# Patient Record
Sex: MALE | Race: WHITE | Employment: UNEMPLOYED | URBAN - METROPOLITAN AREA
[De-identification: names, ages, dates, MRNs, and addresses within clinical notes are randomized per-mention and may not be internally consistent; named-entity substitution may affect disease eponyms.]

---

## 2017-03-05 ENCOUNTER — HOSPITAL ENCOUNTER (INPATIENT)
Facility: CLINIC | Age: 42
LOS: 4 days | Discharge: HOME | DRG: 897 | End: 2017-03-09
Attending: FAMILY MEDICINE | Admitting: PSYCHIATRY & NEUROLOGY
Payer: COMMERCIAL

## 2017-03-05 DIAGNOSIS — F11.23 OPIOID DEPENDENCE WITH WITHDRAWAL (H): ICD-10-CM

## 2017-03-05 DIAGNOSIS — F11.20 UNCOMPLICATED OPIOID DEPENDENCE (H): ICD-10-CM

## 2017-03-05 DIAGNOSIS — F11.20 OPIOID USE DISORDER, SEVERE, DEPENDENCE (H): Primary | ICD-10-CM

## 2017-03-05 DIAGNOSIS — F39 MOOD DISORDER (H): ICD-10-CM

## 2017-03-05 PROBLEM — F11.93 HEROIN WITHDRAWAL (H): Status: ACTIVE | Noted: 2017-03-05

## 2017-03-05 LAB
AMPHETAMINES UR QL SCN: ABNORMAL
BARBITURATES UR QL: ABNORMAL
BENZODIAZ UR QL: ABNORMAL
CANNABINOIDS UR QL SCN: ABNORMAL
COCAINE UR QL: ABNORMAL
ETHANOL UR QL SCN: ABNORMAL
OPIATES UR QL SCN: ABNORMAL

## 2017-03-05 PROCEDURE — 80307 DRUG TEST PRSMV CHEM ANLYZR: CPT | Performed by: FAMILY MEDICINE

## 2017-03-05 PROCEDURE — 99285 EMERGENCY DEPT VISIT HI MDM: CPT | Performed by: FAMILY MEDICINE

## 2017-03-05 PROCEDURE — 99284 EMERGENCY DEPT VISIT MOD MDM: CPT | Mod: Z6 | Performed by: FAMILY MEDICINE

## 2017-03-05 PROCEDURE — 12800008 ZZH R&B CD ADULT

## 2017-03-05 PROCEDURE — HZ2ZZZZ DETOXIFICATION SERVICES FOR SUBSTANCE ABUSE TREATMENT: ICD-10-PCS | Performed by: PSYCHIATRY & NEUROLOGY

## 2017-03-05 PROCEDURE — 25000132 ZZH RX MED GY IP 250 OP 250 PS 637: Performed by: PSYCHIATRY & NEUROLOGY

## 2017-03-05 PROCEDURE — 80320 DRUG SCREEN QUANTALCOHOLS: CPT | Performed by: FAMILY MEDICINE

## 2017-03-05 RX ORDER — LOPERAMIDE HCL 2 MG
2 CAPSULE ORAL 4 TIMES DAILY PRN
Status: DISCONTINUED | OUTPATIENT
Start: 2017-03-05 | End: 2017-03-09 | Stop reason: HOSPADM

## 2017-03-05 RX ORDER — NICOTINE 21 MG/24HR
1 PATCH, TRANSDERMAL 24 HOURS TRANSDERMAL DAILY
Status: DISCONTINUED | OUTPATIENT
Start: 2017-03-05 | End: 2017-03-09 | Stop reason: HOSPADM

## 2017-03-05 RX ORDER — IBUPROFEN 600 MG/1
600 TABLET, FILM COATED ORAL EVERY 6 HOURS PRN
Status: DISCONTINUED | OUTPATIENT
Start: 2017-03-05 | End: 2017-03-09 | Stop reason: HOSPADM

## 2017-03-05 RX ORDER — ONDANSETRON 4 MG/1
4 TABLET, ORALLY DISINTEGRATING ORAL EVERY 6 HOURS PRN
Status: DISCONTINUED | OUTPATIENT
Start: 2017-03-05 | End: 2017-03-09 | Stop reason: HOSPADM

## 2017-03-05 RX ORDER — ALUMINA, MAGNESIA, AND SIMETHICONE 2400; 2400; 240 MG/30ML; MG/30ML; MG/30ML
30 SUSPENSION ORAL EVERY 4 HOURS PRN
Status: DISCONTINUED | OUTPATIENT
Start: 2017-03-05 | End: 2017-03-09 | Stop reason: HOSPADM

## 2017-03-05 RX ORDER — BISACODYL 10 MG
10 SUPPOSITORY, RECTAL RECTAL DAILY PRN
Status: DISCONTINUED | OUTPATIENT
Start: 2017-03-05 | End: 2017-03-09 | Stop reason: HOSPADM

## 2017-03-05 RX ORDER — DIVALPROEX SODIUM 500 MG/1
1000 TABLET, EXTENDED RELEASE ORAL AT BEDTIME
Status: ON HOLD | COMMUNITY
End: 2017-03-07

## 2017-03-05 RX ORDER — TRAZODONE HYDROCHLORIDE 50 MG/1
50 TABLET, FILM COATED ORAL
Status: DISCONTINUED | OUTPATIENT
Start: 2017-03-05 | End: 2017-03-09 | Stop reason: HOSPADM

## 2017-03-05 RX ORDER — ACETAMINOPHEN 325 MG/1
650 TABLET ORAL EVERY 4 HOURS PRN
Status: DISCONTINUED | OUTPATIENT
Start: 2017-03-05 | End: 2017-03-09 | Stop reason: HOSPADM

## 2017-03-05 RX ORDER — LORAZEPAM 1 MG/1
1-2 TABLET ORAL EVERY 4 HOURS PRN
Status: DISCONTINUED | OUTPATIENT
Start: 2017-03-05 | End: 2017-03-06

## 2017-03-05 RX ORDER — HYDROXYZINE HYDROCHLORIDE 25 MG/1
25-50 TABLET, FILM COATED ORAL EVERY 4 HOURS PRN
Status: DISCONTINUED | OUTPATIENT
Start: 2017-03-05 | End: 2017-03-09 | Stop reason: HOSPADM

## 2017-03-05 RX ORDER — DIVALPROEX SODIUM 500 MG/1
1000 TABLET, EXTENDED RELEASE ORAL AT BEDTIME
Status: DISCONTINUED | OUTPATIENT
Start: 2017-03-05 | End: 2017-03-09 | Stop reason: HOSPADM

## 2017-03-05 RX ADMIN — LORAZEPAM 2 MG: 1 TABLET ORAL at 20:38

## 2017-03-05 RX ADMIN — LOPERAMIDE HYDROCHLORIDE 2 MG: 2 CAPSULE ORAL at 20:38

## 2017-03-05 RX ADMIN — NICOTINE 1 PATCH: 21 PATCH, EXTENDED RELEASE TRANSDERMAL at 20:37

## 2017-03-05 RX ADMIN — DIVALPROEX SODIUM 1000 MG: 500 TABLET, EXTENDED RELEASE ORAL at 20:38

## 2017-03-05 ASSESSMENT — ACTIVITIES OF DAILY LIVING (ADL)
ORAL_HYGIENE: INDEPENDENT
GROOMING: INDEPENDENT
DRESS: INDEPENDENT

## 2017-03-05 NOTE — ED NOTES
Heroin IV, 1/2 to 1 gram or more daily. Last use today at 0600. Denies Benzo and Alcohol use. Detox bed held.

## 2017-03-05 NOTE — IP AVS SNAPSHOT
MRN:[id number]                      After Visit Summary   3/5/2017    Jesus Harrison    MRN: [id number]           Thank you!     Thank you for choosing [facility] for your care. Our goal is always to provide you with excellent care.        Patient Information     Date Of Birth          1975        About your hospital stay     You were admitted on:  March 5, 2017 You last received care in the:  [facility]    You were discharged on:  March 9, 2017       Who to Call     For medical emergencies, please call 911.  For non-urgent questions about your medical care, please call your primary care provider or clinic, [phone]          Attending Provider     Provider Specialty    Aldair Nava MD [facility], Carla Chen MD Pediatrics       Primary Care Provider Office Phone # Fax #    [facility] [phone]       [address] [facility] [address]        Further instructions from your care team       Behavioral Discharge Planning and Instructions  THANK YOU FOR CHOOSING THE [address]  [phone]    Summary: You were admitted to [facility] on 3/5/17 for opiate dependence. Stable detox status @ present.  You are being discharged to home in [place].  Please take care and make your recovery a priority.    Recommendation:  Residential Treatment, psychotherapy, sober support group engagement and active work with a sponsor or  through [facility].    Main Diagnosis:  Opiate dependence and withdrawal    Major Treatments, Procedures and Findings:  You received medical treatment for the symptoms of opiate withdrawal. A medical exam was performed that included lab work. You have met with a .       Symptoms to Report:  If you experience more anxiety, confusion, sleeplessness, deep sadness or thoughts of suicide, notify your treatment team or notify  your primary care physician. IF ANY OF THE SYMPTOMS YOU ARE EXPERIENCING ARE A MEDICAL EMERGENCY CALL 911 IMMEDIATELY.     Lifestyle Adjustment: Adjust your lifestyle to get enough sleep, relaxation, exercise and  good nutrition. Continue to develop healthy coping skills to decrease stress and promote a sober living environment. Do not use alcohol, illegal drugs or addictive medications other than what is currently prescribed. AA, NA, and  Sponsor are good resources for support.     Primary Provider:  Jigar follow-up    [facility] [phone]    Support Group:  AA/NA and Sponsor/support    Crisis Intervention: 959.237.5401 or 815-552-0931 (TTY: 276.426.8354).  Call anytime for help.  National [place] on Mental Illness (www.mn.negar.org): 287.764.9581 or 645-893-5795.  Alcoholics Anonymous (www.alcoholics-anonymous.org): Check your phone book for your local chapter.  Suicide Awareness Voices of Education (SAVE) (www.save.org): 787-403-UBJO (8696)  National Suicide Prevention Line (www.mentalhealthmn.org): 227-615-CIXT (1127)  Mental Health Consumer/Survivor Network of [place] (www.mhcsn.net): 591.168.2603 or 528-786-8235  Mental Health Association of [place] (www.mentalhealth.org): 784.107.4989 or 325-532-7033   Substance Abuse and Mental Health Services (www.samhsa.gov)    [place] [facility] ([facility])  [facility] connects people seeking recovery to resources that help foster and sustain long-term recovery.  Whether you are seeking resources for treatment, transportation, housing, job training, education, health care or other pathways to recovery, [facility] is a great place to start.  888.575.6074.  Www[facility].org    General Medication Instructions:   See your medication sheet(s) for instructions.   Take all medicines as directed.  Make no changes unless your doctor suggests them.   Go to all your doctor visits.  Be sure to have all your required lab tests. This way, your medicines can be refilled on  "time.  Do not use any drugs not prescribed by your provider.  AA/NA and Sponsors are excellent resources for support  Avoid alcohol.    Please Note:  Please take this discharge folder with you to all your follow up appointments, it contains your lab results, diagnosis, medication list and discharge recommendations.      Pending Results     No orders found from 3/3/2017 to 3/6/2017.            Admission Information     Date & Time Provider Department Dept. Phone    3/5/2017 Carla Fuchs MD [facility] [phone]      Your Vitals Were     Blood Pressure Pulse Temperature Respirations Height Weight    103/80 (BP Location: Left arm) 93 97.6  F (36.4  C) (Oral) 16 1.702 m (5' 7\") 72.6 kg (160 lb)    Pulse Oximetry BMI (Body Mass Index)                97% 25.06 kg/m2          MyChart Information     [employer] Shopping Cart Software lets you send messages to your doctor, view your test results, renew your prescriptions, schedule appointments and more. To sign up, go to [email or web address] . Click on \"Log in\" on the left side of the screen, which will take you to the Welcome page. Then click on \"Sign up Now\" on the right side of the page.     You will be asked to enter the access code listed below, as well as some personal information. Please follow the directions to create your username and password.     Your access code is: [email or web address]  Expires: 2017  1:09 PM     Your access code will  in 90 days. If you need help or a new code, please call your [facility] clinic or [phone].        Care EveryWhere ID     This is your Care EveryWhere ID. This could be used by other organizations to access your [facility] medical records  [id number]           Review of your medicines      START taking        Dose / Directions    acetaminophen 325 MG tablet   Commonly known as:  TYLENOL   Used for:  Opioid use disorder, severe, dependence (H)        Dose:  650 mg   Take 2 tablets (650 mg) by mouth every 4 " hours as needed for mild pain   Quantity:  100 tablet   Refills:  0       buprenorphine HCl-naloxone HCl 4-1 MG per film   Commonly known as:  SUBOXONE   Used for:  Opioid use disorder, severe, dependence (H)        Dose:  1 Film   Place 1 Film under the tongue 3 times daily   Quantity:  84 Film   Refills:  0       cloNIDine 0.1 MG/24HR WK patch   Commonly known as:  CATAPRES-TTS1   Used for:  Opioid use disorder, severe, dependence (H)        Dose:  1 patch   Place 1 patch onto the skin once a week   Quantity:  4 patch   Refills:  0       hydrOXYzine 25 MG tablet   Commonly known as:  ATARAX   Used for:  Opioid use disorder, severe, dependence (H)        Dose:  25-50 mg   Take 1-2 tablets (25-50 mg) by mouth every 6 hours as needed for anxiety   Quantity:  120 tablet   Refills:  0       ibuprofen 600 MG tablet   Commonly known as:  ADVIL/MOTRIN        Dose:  600 mg   Take 1 tablet (600 mg) by mouth every 8 hours as needed for moderate pain   Quantity:  120 tablet   Refills:  0       naloxone 1 mg/mL for intranasal kit (2 syringes with 2 mucosal atomizer device)   Commonly known as:  NARCAN   Used for:  Opioid use disorder, severe, dependence (H)        Give for opioid overdose.  Insert white cone into nostril and push plunger to spray one-half (1/2) of syringe contents into each nostril.  Repeat after 3 minutes if no or minimal response until medical assistance arrives.   Quantity:  1 kit   Refills:  1       QUEtiapine 25 MG tablet   Commonly known as:  SEROquel   Used for:  Mood disorder (H)        Dose:  25-50 mg   Take 1-2 tablets (25-50 mg) by mouth 3 times daily as needed (agitation)   Quantity:  60 tablet   Refills:  0       traZODone 50 MG tablet   Commonly known as:  DESYREL   Used for:  Opioid use disorder, severe, dependence (H), Mood disorder (H)        Dose:   mg   Take 1-2 tablets ( mg) by mouth nightly as needed for sleep   Quantity:  60 tablet   Refills:  0         CONTINUE these  medicines which have NOT CHANGED        Dose / Directions    divalproex 500 MG 24 hr tablet   Commonly known as:  DEPAKOTE ER   Used for:  Mood disorder (H)        Dose:  1000 mg   Take 2 tablets (1,000 mg) by mouth At Bedtime   Quantity:  60 tablet   Refills:  0            Where to get your medicines      These medications were sent to [facility] [place], [place] - [address]  [address] [facility] [address], [facility] [address]     Phone:  [phone]     acetaminophen 325 MG tablet    cloNIDine 0.1 MG/24HR WK patch    divalproex 500 MG 24 hr tablet    hydrOXYzine 25 MG tablet    ibuprofen 600 MG tablet    naloxone 1 mg/mL for intranasal kit (2 syringes with 2 mucosal atomizer device)    QUEtiapine 25 MG tablet    traZODone 50 MG tablet         Some of these will need a paper prescription and others can be bought over the counter. Ask your nurse if you have questions.     Bring a paper prescription for each of these medications     buprenorphine HCl-naloxone HCl 4-1 MG per film                Protect others around you: Learn how to safely use, store and throw away your medicines at www.disposemymeds.org.             Medication List: This is a list of all your medications and when to take them. Check marks below indicate your daily home schedule. Keep this list as a reference.      Medications           Morning Afternoon Evening Bedtime As Needed    acetaminophen 325 MG tablet   Commonly known as:  TYLENOL   Take 2 tablets (650 mg) by mouth every 4 hours as needed for mild pain                            For pain       buprenorphine HCl-naloxone HCl 4-1 MG per film   Commonly known as:  SUBOXONE   Place 1 Film under the tongue 3 times daily            8 AM       2 PM       8 PM               cloNIDine 0.1 MG/24HR WK patch   Commonly known as:  CATAPRES-TTS1   Place 1 patch onto the skin once a week   Last time this was given:  1 patch on 3/9/2017  7:59 AM                                divalproex 500  MG 24 hr tablet   Commonly known as:  DEPAKOTE ER   Take 2 tablets (1,000 mg) by mouth At Bedtime   Last time this was given:  1,000 mg on 3/8/2017  9:24 PM                                   hydrOXYzine 25 MG tablet   Commonly known as:  ATARAX   Take 1-2 tablets (25-50 mg) by mouth every 6 hours as needed for anxiety   Last time this was given:  50 mg on 3/8/2017  8:24 AM                            For anxiety       ibuprofen 600 MG tablet   Commonly known as:  ADVIL/MOTRIN   Take 1 tablet (600 mg) by mouth every 8 hours as needed for moderate pain                            For pain       naloxone 1 mg/mL for intranasal kit (2 syringes with 2 mucosal atomizer device)   Commonly known as:  NARCAN   Give for opioid overdose.  Insert white cone into nostril and push plunger to spray one-half (1/2) of syringe contents into each nostril.  Repeat after 3 minutes if no or minimal response until medical assistance arrives.                            For overdose       QUEtiapine 25 MG tablet   Commonly known as:  SEROquel   Take 1-2 tablets (25-50 mg) by mouth 3 times daily as needed (agitation)   Last time this was given:  50 mg on 3/9/2017  7:55 AM                                traZODone 50 MG tablet   Commonly known as:  DESYREL   Take 1-2 tablets ( mg) by mouth nightly as needed for sleep                            For sleep

## 2017-03-05 NOTE — IP AVS SNAPSHOT
[facility]    [address] [place] [address]    Phone:  [phone]                                       After Visit Summary   3/5/2017    Jesus Harrison    MRN: [id number]           After Visit Summary Signature Page     I have received my discharge instructions, and my questions have been answered. I have discussed any challenges I see with this plan with the nurse or doctor.    ..........................................................................................................................................  Patient/Patient Representative Signature      ..........................................................................................................................................  Patient Representative Print Name and Relationship to Patient    ..................................................               ................................................  Date                                            Time    ..........................................................................................................................................  Reviewed by Signature/Title    ...................................................              ..............................................  Date                                                            Time           Home

## 2017-03-05 NOTE — ED PROVIDER NOTES
"  History     Chief Complaint   Patient presents with     Addiction Problem     Heroin IV, 1/2 to 1 gram or more daily. Last use today at 0600. Denies Benzo and Alcohol use. Detox bed held.      HPI  Jesus Harrison is a 41 year old male who presents seeking detox from heroin.  States he has been using heroin 1/2 g IV for long time.  No recent detox.  He develops typical opiate withdrawal symptoms such as sweats, nausea, restlessness, myalgias and he is not use.  Occasionally uses methamphetamine including once earlier this week but states \"this is not my usual thing\".  He denies use of alcohol, marijuana or other substances.  His last use of heroin was rather this morning.  He has a history of depression and is taking antidepressants but is not suicidal or homicidal.  He has no medical complaints.    I have reviewed the Medications, Allergies, Past Medical and Surgical History, and Social History in the Epic system.    Review of Systems  All other systems reviewed and were negative    Physical Exam      Physical Exam   Constitutional: He is oriented to person, place, and time. He appears well-developed and well-nourished.   HENT:   Head: Normocephalic and atraumatic.   Mouth/Throat: Oropharynx is clear and moist.   Eyes: EOM are normal. Pupils are equal, round, and reactive to light.   Neck: Normal range of motion. Neck supple. No tracheal deviation present. No thyromegaly present.   Cardiovascular: Normal rate, regular rhythm, normal heart sounds and intact distal pulses.  Exam reveals no gallop and no friction rub.    No murmur heard.  Pulmonary/Chest: Effort normal and breath sounds normal. He exhibits no tenderness.   Abdominal: Soft. Bowel sounds are normal. He exhibits no distension and no mass. There is no tenderness.   Musculoskeletal: He exhibits no edema or tenderness.   Patient has needle tracks on both upper extremities without signs of abscess or phlebitis.   Neurological: He is alert and oriented to " person, place, and time. No cranial nerve deficit. Coordination normal.   Skin: Skin is warm and dry. No rash noted.   Psychiatric: He has a normal mood and affect. His behavior is normal.   Nursing note and vitals reviewed.      ED Course     ED Course     Procedures             Critical Care time:  none               Labs Ordered and Resulted from Time of ED Arrival Up to the Time of Departure from the ED - No data to display    Assessments & Plan (with Medical Decision Making)   Patient presents seeking detox from opiates.  He went through detox here in 2014.  Patient has been unable to stop using drugs in the community due to both physical and psychological symptoms.  Continued use will put the patient at risk for medical and/or psychiatric complications.  Patient appears to be an appropriate candidate for voluntary admission to our detox unit.  The patient is otherwise medically and psychiatrically stable and voluntary.      I have reviewed the nursing notes.    I have reviewed the findings, diagnosis, plan and need for follow up with the patient.    New Prescriptions    No medications on file       Final diagnoses:   Uncomplicated opioid dependence (H)       3/5/2017   [facility], [place], EMERGENCY DEPARTMENT     Aldair Nava MD  03/05/17 7211

## 2017-03-05 NOTE — PHARMACY-ADMISSION MEDICATION HISTORY
Admission Medication History status for the 3/5/2017 admission is complete.  See EPIC admission navigator for Prior to Admission medications.    Medication history sources:  Patient, medication bottle     Medication history source reliability: Good; patient brought along medication bottle to confirm medication and dose    Medication adherence:  Patient reports he started this medication two weeks ago, but has been taking.    Changes made to PTA medication list (reason)  Added: None  Deleted:   - Suboxone; therapy was complete  - Trazodone; patient is no longer taking  Changed:   - Divalproex ER 1000 mg po qHS per patient (updated admin time)    Additional medication history information (including reliability of information, actions taken by pharmacist): None    Time spent in this activity: 5 minutes    Medication history completed by: Yeimi Teran, Nata    Prior to Admission medications    Medication Sig Last Dose Taking? Auth Provider   divalproex (DEPAKOTE ER) 500 MG 24 hr tablet Take 1,000 mg by mouth At Bedtime  3/4/2017 at  Yes Reported, Patient

## 2017-03-06 PROBLEM — Z86.59 H/O BIPOLAR DISORDER: Status: ACTIVE | Noted: 2017-03-06

## 2017-03-06 PROBLEM — F15.20 MODERATE METHAMPHETAMINE USE DISORDER (H): Status: ACTIVE | Noted: 2017-03-06

## 2017-03-06 PROBLEM — F11.23 OPIOID DEPENDENCE WITH WITHDRAWAL (H): Status: ACTIVE | Noted: 2017-03-05

## 2017-03-06 LAB
ALBUMIN SERPL-MCNC: 3.1 G/DL (ref 3.4–5)
ALP SERPL-CCNC: 79 U/L (ref 40–150)
ALT SERPL W P-5'-P-CCNC: 24 U/L (ref 0–70)
ANION GAP SERPL CALCULATED.3IONS-SCNC: 9 MMOL/L (ref 3–14)
AST SERPL W P-5'-P-CCNC: 20 U/L (ref 0–45)
BASOPHILS # BLD AUTO: 0.1 10E9/L (ref 0–0.2)
BASOPHILS NFR BLD AUTO: 1.2 %
BILIRUB SERPL-MCNC: 0.2 MG/DL (ref 0.2–1.3)
BUN SERPL-MCNC: 16 MG/DL (ref 7–30)
CALCIUM SERPL-MCNC: 8.3 MG/DL (ref 8.5–10.1)
CHLORIDE SERPL-SCNC: 106 MMOL/L (ref 94–109)
CO2 SERPL-SCNC: 27 MMOL/L (ref 20–32)
CREAT SERPL-MCNC: 0.73 MG/DL (ref 0.66–1.25)
DIFFERENTIAL METHOD BLD: NORMAL
EOSINOPHIL # BLD AUTO: 0.3 10E9/L (ref 0–0.7)
EOSINOPHIL NFR BLD AUTO: 7.1 %
ERYTHROCYTE [DISTWIDTH] IN BLOOD BY AUTOMATED COUNT: 12.9 % (ref 10–15)
GFR SERPL CREATININE-BSD FRML MDRD: ABNORMAL ML/MIN/1.7M2
GLUCOSE SERPL-MCNC: 107 MG/DL (ref 70–99)
HBV CORE IGM SERPL QL IA: NORMAL
HBV SURFACE AG SERPL QL IA: NONREACTIVE
HCT VFR BLD AUTO: 41.3 % (ref 40–53)
HCV AB SERPL QL IA: NORMAL
HGB BLD-MCNC: 13.4 G/DL (ref 13.3–17.7)
HIV 1+2 AB+HIV1 P24 AG SERPL QL IA: NORMAL
IMM GRANULOCYTES # BLD: 0 10E9/L (ref 0–0.4)
IMM GRANULOCYTES NFR BLD: 0 %
LYMPHOCYTES # BLD AUTO: 1.8 10E9/L (ref 0.8–5.3)
LYMPHOCYTES NFR BLD AUTO: 41.9 %
MCH RBC QN AUTO: 28.4 PG (ref 26.5–33)
MCHC RBC AUTO-ENTMCNC: 32.4 G/DL (ref 31.5–36.5)
MCV RBC AUTO: 88 FL (ref 78–100)
MONOCYTES # BLD AUTO: 0.4 10E9/L (ref 0–1.3)
MONOCYTES NFR BLD AUTO: 8.9 %
NEUTROPHILS # BLD AUTO: 1.7 10E9/L (ref 1.6–8.3)
NEUTROPHILS NFR BLD AUTO: 40.9 %
NRBC # BLD AUTO: 0 10*3/UL
NRBC BLD AUTO-RTO: 0 /100
PLATELET # BLD AUTO: 228 10E9/L (ref 150–450)
POTASSIUM SERPL-SCNC: 4.1 MMOL/L (ref 3.4–5.3)
PROT SERPL-MCNC: 6.6 G/DL (ref 6.8–8.8)
RBC # BLD AUTO: 4.72 10E12/L (ref 4.4–5.9)
SODIUM SERPL-SCNC: 142 MMOL/L (ref 133–144)
WBC # BLD AUTO: 4.3 10E9/L (ref 4–11)

## 2017-03-06 PROCEDURE — 85025 COMPLETE CBC W/AUTO DIFF WBC: CPT | Performed by: PSYCHIATRY & NEUROLOGY

## 2017-03-06 PROCEDURE — 25900015 H RX 259: Performed by: PSYCHIATRY & NEUROLOGY

## 2017-03-06 PROCEDURE — 99223 1ST HOSP IP/OBS HIGH 75: CPT | Mod: AI | Performed by: PSYCHIATRY & NEUROLOGY

## 2017-03-06 PROCEDURE — 87389 HIV-1 AG W/HIV-1&-2 AB AG IA: CPT | Performed by: PSYCHIATRY & NEUROLOGY

## 2017-03-06 PROCEDURE — 36415 COLL VENOUS BLD VENIPUNCTURE: CPT | Performed by: PSYCHIATRY & NEUROLOGY

## 2017-03-06 PROCEDURE — 25000132 ZZH RX MED GY IP 250 OP 250 PS 637: Performed by: PSYCHIATRY & NEUROLOGY

## 2017-03-06 PROCEDURE — 87340 HEPATITIS B SURFACE AG IA: CPT | Performed by: PSYCHIATRY & NEUROLOGY

## 2017-03-06 PROCEDURE — 86803 HEPATITIS C AB TEST: CPT | Performed by: PSYCHIATRY & NEUROLOGY

## 2017-03-06 PROCEDURE — 86705 HEP B CORE ANTIBODY IGM: CPT | Performed by: PSYCHIATRY & NEUROLOGY

## 2017-03-06 PROCEDURE — 12800008 ZZH R&B CD ADULT

## 2017-03-06 PROCEDURE — 80053 COMPREHEN METABOLIC PANEL: CPT | Performed by: PSYCHIATRY & NEUROLOGY

## 2017-03-06 RX ORDER — BUPRENORPHINE 2 MG/1
4 TABLET SUBLINGUAL ONCE
Status: DISCONTINUED | OUTPATIENT
Start: 2017-03-06 | End: 2017-03-06

## 2017-03-06 RX ORDER — BUPRENORPHINE 2 MG/1
4 TABLET SUBLINGUAL
Status: COMPLETED | OUTPATIENT
Start: 2017-03-06 | End: 2017-03-06

## 2017-03-06 RX ORDER — BUPRENORPHINE 2 MG/1
4 TABLET SUBLINGUAL ONCE
Status: COMPLETED | OUTPATIENT
Start: 2017-03-06 | End: 2017-03-06

## 2017-03-06 RX ORDER — BUPRENORPHINE 2 MG/1
4 TABLET SUBLINGUAL ONCE
Status: DISCONTINUED | OUTPATIENT
Start: 2017-03-07 | End: 2017-03-07

## 2017-03-06 RX ADMIN — BUPRENORPHINE HCL 4 MG: 2 TABLET SUBLINGUAL at 12:19

## 2017-03-06 RX ADMIN — DIVALPROEX SODIUM 1000 MG: 500 TABLET, EXTENDED RELEASE ORAL at 22:02

## 2017-03-06 RX ADMIN — BUPRENORPHINE HCL 4 MG: 2 TABLET SUBLINGUAL at 17:37

## 2017-03-06 RX ADMIN — NICOTINE 1 PATCH: 21 PATCH, EXTENDED RELEASE TRANSDERMAL at 12:16

## 2017-03-06 NOTE — PROGRESS NOTES
03/05/17 1907   Patient Belongings   Did you bring any home meds/supplements to the hospital?  Yes   Disposition of meds  Sent to security/pharmacy per site process   Patient Belongings other (see comments)   Disposition of Belongings STORAGE AREA   Belongings Search Yes   Clothing Search Yes   Second Staff CHANDA   General Info Comment SEE COMMENT     STORAGE BIN:   Tennis Shoes, 3 sweat pants, winter jacket,Backpack, toiletries and sharps in toiletries bag  SECURITY:   ENV # [id number] MEDICATION: 1 Bottle Divalproex ER 500mg tab ZYD   ENV # [id number] VALUABLES     3 [place], 1 [place]  License([place]), 3 Visa Cards, 2 Health Ins. Cards $12.00 cash   LOCKED DRAWER: 2 Cell phones, 1   ADMISSION:  I am responsible for any personal items that are not sent to the safe or pharmacy. [facility] is not responsible for loss, theft or damage of any property in my possession.  Patient Signature _____________________ Date/Time _____________________  Staff Signature _______________________ Date/Time _____________________  2nd Staff person, if patient is unable/unwilling to sign_________________________Date/Time_______________  DISCHARGE:  My personal items have been returned to me.   Patient Signature _____________________ Date/Time _____________________

## 2017-03-06 NOTE — H&P
"Addiction Medicine History and Physical    Jesus Harrison MRN# [id number]   Age: 41 year old YOB: 1975     Date of Admission:  3/5/2017  Date of H&P:   March 6, 2017    Primary care provider: Clinic, [facility]          Assessment and Plan:   Assessment:   Principal Problem:    Opioid dependence with withdrawal (H)  Active Problems:    Moderate methamphetamine use disorder    H/O bipolar disorder, cognitive disorder?    Opioid use disorder, severe, dependence (H)        Plan:   Admit to 3A  Monitor and manage opioid withdrawal with comfort medication and buprenorphine taper  Continue PTA valproic acid  Dispo planning ongoing; see CM notes for details, but patient unsure about maintenance medication, states hope to pursue treatment in [place] where girlfriend lives. Given his psychiatric history per care Everywhere, is best served by dual diagnosis programming          Chief Complaint:   Opioid withdrawal     History is obtained from the patient, and chart review          History of Present Illness:   This patient is a 41 year old single, unemployed male, currently living with parents, with history of heroin and methamphetamine IV use, who presented to our ED seeking detoxification and treatment. Has been unable to work, previously did tree work. Denies anxiety/depression yet records indicate h/o bipolar disorder, amended to cognitive disorder, intermittent anxiety/depression. Does not seem to be intentionally deceiving/withholding - patient quite ill with withdrawal. Denies physical complaints outside of withdrawal. Denies intercurrent injury. Was using a gram IV heroin daily, intermittent methamphetamine binges IV. Withdrawal complaints at time of exam are hypersomnolence from methamphetamine \"crash\"; irritability, sweats/chills, myalgias, rhinorrhea from opioids. Desires buprenorphine for withdrawal management, as yet undecided about maintenance. States he hopes to seek treatment in " [place] where girlfriend lives. He is admitted voluntarily.             Past Medical History:     I have reviewed this patient's past medical history. Denies medical problems, but as noted has depression/anxiety, bipolar history in HP system. Asthma. Multiple fracture history. Per notes, looks like PCP has been kind enough to see patient for behavioral health issues, with urging to re-establish Psychiatric care.         Past Surgical History:      I have reviewed this patient's past surgical history  Past Surgical History   Procedure Laterality Date     Orthopedic surgery - multiple             Social History:   I have reviewed this patient's social history, see HPI.          Family History:   I have reviewed this patient's family history, and it is not directly pertinent to present encounter.         Immunizations:     Immunization History   Administered Date(s) Administered     Influenza Vaccine IM 3yrs+ 4 Valent IIV4 12/08/2014   Per records, received pneumococcal vaccine in HP system this year.          Allergies:   All allergies reviewed and addressed  No Known Allergies          Medications:     Prescriptions Prior to Admission   Medication Sig Dispense Refill Last Dose     divalproex (DEPAKOTE ER) 500 MG 24 hr tablet Take 1,000 mg by mouth At Bedtime    3/4/2017 at [facility]  query result:    Date Dispensed/  Date Prescribed Drug Name/  NDC Qty. Dispensed/  Days Supply Refill #/  Authorized Refills RX # Prescriber Dispenser Recipient MED Daily  11/08/2016 11/07/2016 SUBOXONE 8 MG- 2 MG SL FILM  [id number] 7  7 0  0 [id number] GOODMAN MALCOLM BAGLEY  [place], [place]  [id number] [facility] [address] [place] [address] RADHA SOUZA  1975  [address] [place] [address] 240  05/22/2016 05/22/2016 OXYCODONE HCL 5 MG TABLET  [id number] 15  2 0  0 [id number] EDUARDO BAGLEY  [place], [place]  [id number] [facility] [address] [place] [address] RADHA SOUZA  1975  [address] [place] [address] 56.25  05/20/2016 05/20/2016 OXYCODONE- ACETAMINOPHEN 5- 325  [id number] 15  2 0  0 [id number] JUNE MEYER  [place], [place]  [id number] [facility]  [place], [place] Jesus Harrison  1975  [address] [place] [address] 56.25         Review of Systems:   Complete ROS performed and is negative except as noted in HPI.           Physical Exam:     Vitals were reviewed  Patient Vitals for the past 12 hrs:   BP Temp Temp src Pulse Resp   03/06/17 0805 109/74 97  F (36.1  C) Oral 78 16     Constitutional:   Tired but awakens to alert, non-toxic, in withdrawal distress     Eyes:   Anicteric, mild injection, pupils moderately dilated for light level     ENT:   Clear rhinorrhea, lips dry, mmm     Lungs:   no increased work of breathing and clear to auscultation     Cardiovascular:   Well-perfused, no murmur, no edema     Abdomen:   Non-distended, active bowel sounds, soft, NT, no HSM     Neurologic:   No tremor, normal tone, gait intact     Skin:   Injection sites without infectious signs, flushed, diaphoretic, no jaundice     MENTAL STATUS EXAM  Appearance: in scrubs, unkempt  Attitude: cooperative  Behavior: no agitation or slowing  Eye Contact: intermittent  Speech: coherent, no pressuring  Orientation: oriented to person , place, time and situation  Mood:  \"okay\"  Affect: tired, subdued  Thought Process: linear, goal-directed, no FOI or ESAN  Suicidal Ideation: denies thought/intent/plan  Hallucination: denies  Insight: partial  Judgment: adequate for safety         Data:   All laboratory data reviewed  Results for orders placed or performed during the hospital encounter of 03/05/17   Drug abuse screen 6 urine (tox)   Result Value Ref Range    Amphetamine Qual Urine (A) NEG     Positive   Cutoff for a positive amphetamine is greater than 500 ng/mL. This is an   unconfirmed screening result to be used for medical purposes only.      Barbiturates Qual Urine  NEG     " Negative   Cutoff for a negative barbiturate is 200 ng/mL or less.      Benzodiazepine Qual Urine  NEG     Negative   Cutoff for a negative benzodiazepine is 200 ng/mL or less.      Cannabinoids Qual Urine  NEG     Negative   Cutoff for a negative cannabinoid is 50 ng/mL or less.      Cocaine Qual Urine  NEG     Negative   Cutoff for a negative cocaine is 300 ng/mL or less.      Ethanol Qual Urine  NEG     Negative   Cutoff for a negative urine ethanol is 0.05 g/dL or less      Opiates Qualitative Urine (A) NEG     Positive   Cutoff for a positive opiate is greater than 300 ng/mL. This is an unconfirmed   screening result to be used for medical purposes only.     CBC with platelets differential   Result Value Ref Range    WBC 4.3 4.0 - 11.0 10e9/L    RBC Count 4.72 4.4 - 5.9 10e12/L    Hemoglobin 13.4 13.3 - 17.7 g/dL    Hematocrit 41.3 40.0 - 53.0 %    MCV 88 78 - 100 fl    MCH 28.4 26.5 - 33.0 pg    MCHC 32.4 31.5 - 36.5 g/dL    RDW 12.9 10.0 - 15.0 %    Platelet Count 228 150 - 450 10e9/L    Diff Method Automated Method     % Neutrophils 40.9 %    % Lymphocytes 41.9 %    % Monocytes 8.9 %    % Eosinophils 7.1 %    % Basophils 1.2 %    % Immature Granulocytes 0.0 %    Nucleated RBCs 0 0 /100    Absolute Neutrophil 1.7 1.6 - 8.3 10e9/L    Absolute Lymphocytes 1.8 0.8 - 5.3 10e9/L    Absolute Monocytes 0.4 0.0 - 1.3 10e9/L    Absolute Eosinophils 0.3 0.0 - 0.7 10e9/L    Absolute Basophils 0.1 0.0 - 0.2 10e9/L    Abs Immature Granulocytes 0.0 0 - 0.4 10e9/L    Absolute Nucleated RBC 0.0    Comprehensive metabolic panel   Result Value Ref Range    Sodium 142 133 - 144 mmol/L    Potassium 4.1 3.4 - 5.3 mmol/L    Chloride 106 94 - 109 mmol/L    Carbon Dioxide 27 20 - 32 mmol/L    Anion Gap 9 3 - 14 mmol/L    Glucose 107 (H) 70 - 99 mg/dL    Urea Nitrogen 16 7 - 30 mg/dL    Creatinine 0.73 0.66 - 1.25 mg/dL    GFR Estimate >90  Non  GFR Calc   >60 mL/min/1.7m2    GFR Estimate If Black >90  African  American GFR Calc   >60 mL/min/1.7m2    Calcium 8.3 (L) 8.5 - 10.1 mg/dL    Bilirubin Total 0.2 0.2 - 1.3 mg/dL    Albumin 3.1 (L) 3.4 - 5.0 g/dL    Protein Total 6.6 (L) 6.8 - 8.8 g/dL    Alkaline Phosphatase 79 40 - 150 U/L    ALT 24 0 - 70 U/L    AST 20 0 - 45 U/L    Corrected calcium is normal.     Blood-borne pathogen testing in process/pending    Attestation:  I have reviewed today's vital signs, notes, medications, and labs/studies pertinent to this ecounter.    Indication for hospitalization is severe opioid use disorder (IV heroin) with physical dependence and withdrawal; high degree of complexity due to potential withdrawal morbidity, IV use, complicated by co-occurring methamphetamine use.     Carla Fuchs MD   Pediatrics/Addiction Medicine

## 2017-03-06 NOTE — PLAN OF CARE
Problem: General Plan of Care (Inpatient Behavioral)  Goal: Individualization/Patient Specific Goal (IP Behavioral)  The patient and/or their representative will achieve their patient-specific goals related to the plan of care.    The patient-specific goals include:  Patient will identify reason(s) for hospitalization from their perspective.  Patient will identify a goal for discharge.  Patient will identify triggers that may increase their risk for relapse.  Patient will identify coping skills they can do to stay well.   Patient will identify their support system to demonstrate readiness for discharge.  ..Illnesses Management & Recovery  Patient identified  as trigger for relapse.  Patient identified  as a general wellness strategy.  Patient identified  as a warning sign that they are in danger of relapse.  Patient identified  as someone they cont on to get feedback from.  Patient identified  as a way to take action when in danger of relapse.  Patient identified  as a way to cope with stress or other symptoms.

## 2017-03-07 PROCEDURE — 12800008 ZZH R&B CD ADULT

## 2017-03-07 PROCEDURE — 25000132 ZZH RX MED GY IP 250 OP 250 PS 637: Performed by: PSYCHIATRY & NEUROLOGY

## 2017-03-07 PROCEDURE — 25900015 H RX 259: Performed by: PSYCHIATRY & NEUROLOGY

## 2017-03-07 PROCEDURE — 99232 SBSQ HOSP IP/OBS MODERATE 35: CPT | Performed by: PSYCHIATRY & NEUROLOGY

## 2017-03-07 RX ORDER — IBUPROFEN 600 MG/1
600 TABLET, FILM COATED ORAL EVERY 8 HOURS PRN
Qty: 120 TABLET | Refills: 0 | Status: SHIPPED | OUTPATIENT
Start: 2017-03-07

## 2017-03-07 RX ORDER — BUPRENORPHINE AND NALOXONE 4; 1 MG/1; MG/1
1 FILM, SOLUBLE BUCCAL; SUBLINGUAL 3 TIMES DAILY
Qty: 84 FILM | Refills: 0 | Status: SHIPPED | OUTPATIENT
Start: 2017-03-07

## 2017-03-07 RX ORDER — DIVALPROEX SODIUM 500 MG/1
1000 TABLET, EXTENDED RELEASE ORAL AT BEDTIME
Qty: 60 TABLET | Refills: 0 | Status: SHIPPED | OUTPATIENT
Start: 2017-03-07

## 2017-03-07 RX ORDER — ACETAMINOPHEN 325 MG/1
650 TABLET ORAL EVERY 4 HOURS PRN
Qty: 100 TABLET | Refills: 0 | Status: SHIPPED | OUTPATIENT
Start: 2017-03-07

## 2017-03-07 RX ORDER — QUETIAPINE FUMARATE 25 MG/1
25-50 TABLET, FILM COATED ORAL 3 TIMES DAILY PRN
Status: DISCONTINUED | OUTPATIENT
Start: 2017-03-07 | End: 2017-03-09 | Stop reason: HOSPADM

## 2017-03-07 RX ORDER — BUPRENORPHINE 2 MG/1
4 TABLET SUBLINGUAL 2 TIMES DAILY
Status: DISCONTINUED | OUTPATIENT
Start: 2017-03-07 | End: 2017-03-07

## 2017-03-07 RX ORDER — NALOXONE HYDROCHLORIDE 0.4 MG/ML
.1-.4 INJECTION, SOLUTION INTRAMUSCULAR; INTRAVENOUS; SUBCUTANEOUS
Status: DISCONTINUED | OUTPATIENT
Start: 2017-03-07 | End: 2017-03-09 | Stop reason: HOSPADM

## 2017-03-07 RX ORDER — HYDROXYZINE HYDROCHLORIDE 25 MG/1
25-50 TABLET, FILM COATED ORAL EVERY 6 HOURS PRN
Qty: 120 TABLET | Refills: 0 | Status: SHIPPED | OUTPATIENT
Start: 2017-03-07

## 2017-03-07 RX ORDER — BUPRENORPHINE 2 MG/1
4 TABLET SUBLINGUAL 2 TIMES DAILY
Status: DISCONTINUED | OUTPATIENT
Start: 2017-03-07 | End: 2017-03-09 | Stop reason: DRUGHIGH

## 2017-03-07 RX ORDER — BUPRENORPHINE 2 MG/1
4 TABLET SUBLINGUAL 3 TIMES DAILY
Status: DISCONTINUED | OUTPATIENT
Start: 2017-03-07 | End: 2017-03-07

## 2017-03-07 RX ORDER — TRAZODONE HYDROCHLORIDE 50 MG/1
50-100 TABLET, FILM COATED ORAL
Qty: 60 TABLET | Refills: 0 | Status: SHIPPED | OUTPATIENT
Start: 2017-03-07

## 2017-03-07 RX ADMIN — DIVALPROEX SODIUM 1000 MG: 500 TABLET, EXTENDED RELEASE ORAL at 20:29

## 2017-03-07 RX ADMIN — QUETIAPINE 50 MG: 25 TABLET, FILM COATED ORAL at 16:20

## 2017-03-07 RX ADMIN — BUPRENORPHINE HCL 4 MG: 2 TABLET SUBLINGUAL at 20:29

## 2017-03-07 RX ADMIN — NICOTINE 1 PATCH: 21 PATCH, EXTENDED RELEASE TRANSDERMAL at 08:48

## 2017-03-07 RX ADMIN — QUETIAPINE 50 MG: 25 TABLET, FILM COATED ORAL at 20:29

## 2017-03-07 RX ADMIN — BUPRENORPHINE HCL 4 MG: 2 TABLET SUBLINGUAL at 08:49

## 2017-03-07 ASSESSMENT — ACTIVITIES OF DAILY LIVING (ADL)
GROOMING: INDEPENDENT
ORAL_HYGIENE: INDEPENDENT
LAUNDRY: WITH SUPERVISION
DRESS: INDEPENDENT

## 2017-03-07 NOTE — PROGRESS NOTES
"Case Management Note  3/7/2017    Writer met with pt to initiate discharge planning. Writer found pt curled up in his bed. His room was extremely cold. Pt requested extra blankets because he was cold. Writer adjusted room thermostat to increase the temperature. Writer asked a 3A Psych Associate if pt could have extra blankets, which were provided. Pt reports he is not feeling well. He reports he has no plans for any type of treatment. Inpatient or outpatient. Pt reports his plan is to move to [place] and live with his girlfriend. Pt reports \"that's what I did the last time.\" \"[place].\" Pt reports he has been to treatment before and has no desire to enter into a treatment program upon discharge. Pt apologized for his comments, but reports he is not feeling well. Pt reports he has not completed his intake paperwork. Writer advised pt to seek assistance if needed. Case management to continue.    Milton Santos MA, LADC  " [facility] [place]:     Hospital Day: 2    Postoperative Day: n/a    Status post: n/a    Subjective:    No acute events overnight  Remains clinically stable on oscillatory ventilation    Objective:    MEDICATIONS  (STANDING):  gentamicin  IV Intermittent - Peds 9.5milliGRAM(s) IV Intermittent every 36 hours  ampicillin IV Intermittent - NICU 190milliGRAM(s) IV Intermittent every 12 hours  Parenteral Nutrition -  Starter Bag- dextrose 10% 250milliLiter(s) TPN Continuous <Continuous>  Parenteral Nutrition -  1Each TPN Continuous <Continuous>    MEDICATIONS  (PRN):      Vital Signs Last 24 Hrs  T(C): 37, Max: 37.1 (03-04 @ 21:00)  T(F): 98.6, Max: 98.7 (03-04 @ 21:00)  HR: 132 (109 - 153)  BP: 53/40 (53/34 - 79/53)  BP(mean): 44 (40 - 63)  RR: --  SpO2: 93% (90% - 100%)    I&O's Detail  I & Os for 24h ending 05 Mar 2017 07:00  =============================================  IN:    TPN (Total Parenteral Nutrition): 71.3 ml    dextrose 10% (courtney): 12.4 ml    Instillation: 12 ml    Solution: 2.9 ml    Total IN: 98.6 ml  ---------------------------------------------  OUT:    Voided: 32 ml    Instillation: 10 ml    Total OUT: 42 ml  ---------------------------------------------  Total NET: 56.6 ml    I & Os for current day (as of 05 Mar 2017 14:52)  =============================================  IN:    TPN (Total Parenteral Nutrition): 49.6 ml    Instillation: 8 ml    Total IN: 57.6 ml  ---------------------------------------------  OUT:    Voided: 50 ml    Total OUT: 50 ml  ---------------------------------------------  Total NET: 7.6 ml      Daily Height/Length in cm: 45 (04 Mar 2017 15:26)    Daily     General: WN/small, intubated  Abdominal: Soft, NT, ND  Extremities: No edema    LABS:                        x      x     )-----------( x        ( 04 Mar 2017 19:11 )             50.5     05 Mar 2017 05:25    135    |  96     |  17     ----------------------------<  74     5.1     |  24     |  0.61     Ca    9.7        05 Mar 2017 05:25  Phos  4.5       05 Mar 2017 05:25  Mg     1.6       05 Mar 2017 05:25    7.39 / 44 / 55 / 25 (this AM)     ---> 7.37 / 50 / 57 / 26 (most recent)    RADIOLOGY & ADDITIONAL STUDIES:    CXR (my read) - Stomach in abdomen, somewhat distended. Small amount of lung at left apex, minimal mediastinal shift    Echo (see full report) - Sub-systemic pulmonary pressures, moderate PDA with left to right shunt, normal ventricular morphology and function
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
## 2017-03-08 PROCEDURE — 25000132 ZZH RX MED GY IP 250 OP 250 PS 637: Performed by: PSYCHIATRY & NEUROLOGY

## 2017-03-08 PROCEDURE — 25900015 H RX 259: Performed by: PSYCHIATRY & NEUROLOGY

## 2017-03-08 PROCEDURE — 12800008 ZZH R&B CD ADULT

## 2017-03-08 PROCEDURE — 25000125 ZZHC RX 250: Performed by: PSYCHIATRY & NEUROLOGY

## 2017-03-08 RX ADMIN — QUETIAPINE 50 MG: 25 TABLET, FILM COATED ORAL at 16:06

## 2017-03-08 RX ADMIN — DIVALPROEX SODIUM 1000 MG: 500 TABLET, EXTENDED RELEASE ORAL at 21:24

## 2017-03-08 RX ADMIN — ONDANSETRON 4 MG: 4 TABLET, ORALLY DISINTEGRATING ORAL at 17:51

## 2017-03-08 RX ADMIN — QUETIAPINE 50 MG: 25 TABLET, FILM COATED ORAL at 12:02

## 2017-03-08 RX ADMIN — QUETIAPINE 50 MG: 25 TABLET, FILM COATED ORAL at 21:24

## 2017-03-08 RX ADMIN — BUPRENORPHINE HCL 4 MG: 2 TABLET SUBLINGUAL at 08:24

## 2017-03-08 RX ADMIN — BUPRENORPHINE HCL 4 MG: 2 TABLET SUBLINGUAL at 20:29

## 2017-03-08 RX ADMIN — HYDROXYZINE HYDROCHLORIDE 50 MG: 25 TABLET ORAL at 08:24

## 2017-03-08 RX ADMIN — NICOTINE 1 PATCH: 21 PATCH, EXTENDED RELEASE TRANSDERMAL at 08:24

## 2017-03-08 ASSESSMENT — ACTIVITIES OF DAILY LIVING (ADL)
DRESS: INDEPENDENT
ORAL_HYGIENE: INDEPENDENT
GROOMING: INDEPENDENT
LAUNDRY: WITH SUPERVISION

## 2017-03-08 NOTE — PLAN OF CARE
Problem: Substance Withdrawal  Goal: Substance Withdrawal  Signs and symptoms of listed problems will be absent or manageable.   Outcome: Improving  Patient OWS (PCS) monitored (5@ 0800 and 3 @ 1200) with medications provided as ordered (EMAR) for s/s associated with substance withdrawal. Patient's mood has been calm and cooperative. He endorses anxiety, requested and was given PRN Seroquel which pt reports helped reduce his anxiety. Pt asking about discharging and is planing on talking to Dr. Fuchs in the morning. He denies SI/SIB.

## 2017-03-09 VITALS
HEIGHT: 67 IN | TEMPERATURE: 97.6 F | OXYGEN SATURATION: 97 % | DIASTOLIC BLOOD PRESSURE: 80 MMHG | HEART RATE: 93 BPM | BODY MASS INDEX: 25.11 KG/M2 | WEIGHT: 160 LBS | RESPIRATION RATE: 16 BRPM | SYSTOLIC BLOOD PRESSURE: 103 MMHG

## 2017-03-09 PROCEDURE — 25900015 H RX 259: Performed by: PSYCHIATRY & NEUROLOGY

## 2017-03-09 PROCEDURE — 25000132 ZZH RX MED GY IP 250 OP 250 PS 637: Performed by: PSYCHIATRY & NEUROLOGY

## 2017-03-09 PROCEDURE — 99238 HOSP IP/OBS DSCHRG MGMT 30/<: CPT | Performed by: PSYCHIATRY & NEUROLOGY

## 2017-03-09 RX ORDER — BUPRENORPHINE 2 MG/1
4 TABLET SUBLINGUAL ONCE
Status: DISCONTINUED | OUTPATIENT
Start: 2017-03-10 | End: 2017-03-09 | Stop reason: HOSPADM

## 2017-03-09 RX ORDER — BUPRENORPHINE 2 MG/1
6 TABLET SUBLINGUAL ONCE
Status: COMPLETED | OUTPATIENT
Start: 2017-03-09 | End: 2017-03-09

## 2017-03-09 RX ORDER — QUETIAPINE FUMARATE 25 MG/1
25-50 TABLET, FILM COATED ORAL 3 TIMES DAILY PRN
Qty: 60 TABLET | Refills: 0 | Status: SHIPPED | OUTPATIENT
Start: 2017-03-09

## 2017-03-09 RX ADMIN — QUETIAPINE 50 MG: 25 TABLET, FILM COATED ORAL at 07:55

## 2017-03-09 RX ADMIN — BUPRENORPHINE HCL 6 MG: 2 TABLET SUBLINGUAL at 07:54

## 2017-03-09 RX ADMIN — CLONIDINE 1 PATCH: 0.1 PATCH TRANSDERMAL at 07:59

## 2017-03-09 RX ADMIN — NICOTINE 1 PATCH: 21 PATCH, EXTENDED RELEASE TRANSDERMAL at 07:54

## 2017-03-09 ASSESSMENT — ACTIVITIES OF DAILY LIVING (ADL)
GROOMING: INDEPENDENT
ORAL_HYGIENE: INDEPENDENT
DRESS: INDEPENDENT

## 2017-03-09 NOTE — DISCHARGE INSTRUCTIONS
Behavioral Discharge Planning and Instructions  THANK YOU FOR CHOOSING THE [facility]  3[facility]  [phone]    Summary: You were admitted to Station 3[facility] on 3/5/17 for opiate dependence. Stable detox status @ present.  You are being discharged to home in [place].  Please take care and make your recovery a priority.    Recommendation:  Residential Treatment, psychotherapy, sober support group engagement and active work with a sponsor or  through [facility].    Main Diagnosis:  Opiate dependence and withdrawal    Major Treatments, Procedures and Findings:  You received medical treatment for the symptoms of opiate withdrawal. A medical exam was performed that included lab work. You have met with a .       Symptoms to Report:  If you experience more anxiety, confusion, sleeplessness, deep sadness or thoughts of suicide, notify your treatment team or notify your primary care physician. IF ANY OF THE SYMPTOMS YOU ARE EXPERIENCING ARE A MEDICAL EMERGENCY CALL 911 IMMEDIATELY.     Lifestyle Adjustment: Adjust your lifestyle to get enough sleep, relaxation, exercise and  good nutrition. Continue to develop healthy coping skills to decrease stress and promote a sober living environment. Do not use alcohol, illegal drugs or addictive medications other than what is currently prescribed. AA, NA, and  Sponsor are good resources for support.     Primary Provider:  Declines follow-up    [facility] [phone]    Support Group:  AA/NA and Sponsor/support    Crisis Intervention: 260.655.6823 or 152-482-6386 (TTY: 120.515.8696).  Call anytime for help.  National [place] on Mental Illness (www.[place].negar.org): 891.580.6468 or 971-606-7750.  Alcoholics Anonymous (www.alcoholics-anonymous.org): Check your phone book for your local chapter.  Suicide Awareness Voices of Education (SAVE) (www.save.org): 209-736-HCEY (7518)  National Suicide Prevention Line  (www.mentalhealthmn.org): 373-333-TOCS (8255)  Mental Health Consumer/Survivor Network of [place] (www.mhcsn.net): 668.398.8134 or 799-253-9363  Mental Health Association of [place] (www.mentalhealth.org): 667.817.3595 or 272-706-9573   Substance Abuse and Mental Health Services (www.sama.gov)    [facility] ([facility])  [facility] connects people seeking recovery to resources that help foster and sustain long-term recovery.  Whether you are seeking resources for treatment, transportation, housing, job training, education, health care or other pathways to recovery, [facility] is a great place to start.  [phone].  Www.[facility].org    General Medication Instructions:   See your medication sheet(s) for instructions.   Take all medicines as directed.  Make no changes unless your doctor suggests them.   Go to all your doctor visits.  Be sure to have all your required lab tests. This way, your medicines can be refilled on time.  Do not use any drugs not prescribed by your provider.  AA/NA and Sponsors are excellent resources for support  Avoid alcohol.    Please Note:  Please take this discharge folder with you to all your follow up appointments, it contains your lab results, diagnosis, medication list and discharge recommendations.

## 2017-03-09 NOTE — DISCHARGE SUMMARY
Addiction Medicine Discharge Summary    Jesus Harrison MRN# [id number]   Age: 41 year old YOB: 1975     Date of Admission:  3/5/2017  Date of Discharge::  3/9/2017  1:40 PM  Admitting Physician:  Carla Fuchs MD  Discharge Physician:  Carla Fuchs MD     Primary care provider: Johan, [facility]          Admission Diagnoses:   Uncomplicated opioid dependence (H) [F11.20]          Discharge Diagnosis:   Principal Problem:    Opioid dependence with withdrawal (H), resolved  Active Problems:    Moderate methamphetamine use disorder    H/O bipolar disorder, seems more likely mood lability and impulsivity secondary to TBI    Opioid use disorder, severe, dependence (H), declining treatment recommendations, high likelihood of relapse         Procedures:   All laboratory data reviewed  Results for orders placed or performed during the hospital encounter of 03/05/17   Drug abuse screen 6 urine (tox)   Result Value Ref Range    Amphetamine Qual Urine (A) NEG     Positive   Cutoff for a positive amphetamine is greater than 500 ng/mL. This is an   unconfirmed screening result to be used for medical purposes only.      Barbiturates Qual Urine  NEG     Negative   Cutoff for a negative barbiturate is 200 ng/mL or less.      Benzodiazepine Qual Urine  NEG     Negative   Cutoff for a negative benzodiazepine is 200 ng/mL or less.      Cannabinoids Qual Urine  NEG     Negative   Cutoff for a negative cannabinoid is 50 ng/mL or less.      Cocaine Qual Urine  NEG     Negative   Cutoff for a negative cocaine is 300 ng/mL or less.      Ethanol Qual Urine  NEG     Negative   Cutoff for a negative urine ethanol is 0.05 g/dL or less      Opiates Qualitative Urine (A) NEG     Positive   Cutoff for a positive opiate is greater than 300 ng/mL. This is an unconfirmed   screening result to be used for medical purposes only.     CBC with platelets differential   Result Value Ref Range    WBC 4.3  4.0 - 11.0 10e9/L    RBC Count 4.72 4.4 - 5.9 10e12/L    Hemoglobin 13.4 13.3 - 17.7 g/dL    Hematocrit 41.3 40.0 - 53.0 %    MCV 88 78 - 100 fl    MCH 28.4 26.5 - 33.0 pg    MCHC 32.4 31.5 - 36.5 g/dL    RDW 12.9 10.0 - 15.0 %    Platelet Count 228 150 - 450 10e9/L    Diff Method Automated Method     % Neutrophils 40.9 %    % Lymphocytes 41.9 %    % Monocytes 8.9 %    % Eosinophils 7.1 %    % Basophils 1.2 %    % Immature Granulocytes 0.0 %    Nucleated RBCs 0 0 /100    Absolute Neutrophil 1.7 1.6 - 8.3 10e9/L    Absolute Lymphocytes 1.8 0.8 - 5.3 10e9/L    Absolute Monocytes 0.4 0.0 - 1.3 10e9/L    Absolute Eosinophils 0.3 0.0 - 0.7 10e9/L    Absolute Basophils 0.1 0.0 - 0.2 10e9/L    Abs Immature Granulocytes 0.0 0 - 0.4 10e9/L    Absolute Nucleated RBC 0.0    Comprehensive metabolic panel   Result Value Ref Range    Sodium 142 133 - 144 mmol/L    Potassium 4.1 3.4 - 5.3 mmol/L    Chloride 106 94 - 109 mmol/L    Carbon Dioxide 27 20 - 32 mmol/L    Anion Gap 9 3 - 14 mmol/L    Glucose 107 (H) 70 - 99 mg/dL    Urea Nitrogen 16 7 - 30 mg/dL    Creatinine 0.73 0.66 - 1.25 mg/dL    GFR Estimate >90  Non  GFR Calc   >60 mL/min/1.7m2    GFR Estimate If Black >90   GFR Calc   >60 mL/min/1.7m2    Calcium 8.3 (L) 8.5 - 10.1 mg/dL    Bilirubin Total 0.2 0.2 - 1.3 mg/dL    Albumin 3.1 (L) 3.4 - 5.0 g/dL    Protein Total 6.6 (L) 6.8 - 8.8 g/dL    Alkaline Phosphatase 79 40 - 150 U/L    ALT 24 0 - 70 U/L    AST 20 0 - 45 U/L   Hepatitis C antibody   Result Value Ref Range    Hepatitis C Antibody  NR     Nonreactive   Assay performance characteristics have not been established for newborns,   infants, and children     Hepatitis B core antibody IgM   Result Value Ref Range    Hepatitis B Core IgM  NR     Nonreactive   A nonreactive result suggests lack of recent exposure to the virus in the   preceding 6 months.     Hepatitis B surface antigen   Result Value Ref Range    Hep B Surface Agn  Nonreactive NR   HIV Antigen Antibody Combo   Result Value Ref Range    HIV Antigen Antibody Combo  NR     Nonreactive   HIV-1 p24 Ag & HIV-1/HIV-2 Ab Not Detected            Medications Prior to Admission:     Prescriptions Prior to Admission   Medication Sig Dispense Refill Last Dose     divalproex (DEPAKOTE ER) 500 MG 24 hr tablet Take 1,000 mg by mouth At Bedtime      3/4/2017 at [facility]  query result:     Date Dispensed/  Date Prescribed Drug Name/  NDC Qty. Dispensed/  Days Supply Refill #/  Authorized Refills RX # Prescriber Dispenser Recipient MED Daily  11/08/2016 11/07/2016 SUBOXONE 8 MG- 2 MG SL FILM  [id number] 7  7 0  0 [id number] GOODMAN MALCOLM BAGLEY  [place], [place]  [id number] [facility] -[address] [place] [address] RONALDRADHA OCASIO  1975  [address] [place] [address] 240  05/22/2016 05/22/2016 OXYCODONE HCL 5 MG TABLET  [id number] 15  2 0  0 [id number] LANA KVNG BAGLEY  [place], [place]  [id number] [facility] -[address] [place] [address] RADHA HARRISON  1975  [address] [place] [address] 56.25  05/20/2016 05/20/2016 OXYCODONE- ACETAMINOPHEN 5- 325  [id number] 15  2 0  0 [id number] JUNE ZARATE [place], [place]  [id number] [facility]  [place], [place] Radha Harrison  1975  [address] [place] [address] 56.25        Discharge Medications:     Discharge Medication List as of 3/9/2017  1:10 PM      START taking these medications    Details   cloNIDine (CATAPRES-TTS1) 0.1 MG/24HR WK patch Place 1 patch onto the skin once a week, Disp-4 patch, R-0, E-Prescribe      QUEtiapine (SEROQUEL) 25 MG tablet Take 1-2 tablets (25-50 mg) by mouth 3 times daily as needed (agitation), Disp-60 tablet, R-0, E-Prescribe      acetaminophen (TYLENOL) 325 MG tablet Take 2 tablets (650 mg) by mouth every 4 hours as needed for mild pain, Disp-100 tablet, R-0, E-Prescribe      ibuprofen (ADVIL/MOTRIN) 600 MG tablet Take 1 tablet (600 mg) by mouth  every 8 hours as needed for moderate pain, Disp-120 tablet, R-0, E-Prescribe      hydrOXYzine (ATARAX) 25 MG tablet Take 1-2 tablets (25-50 mg) by mouth every 6 hours as needed for anxiety, Disp-120 tablet, R-0, E-Prescribe      traZODone (DESYREL) 50 MG tablet Take 1-2 tablets ( mg) by mouth nightly as needed for sleep, Disp-60 tablet, R-0, E-Prescribe      naloxone (NARCAN) 1 mg/mL for intranasal kit (2 syringes with 2 mucosal atomizer device) Give for opioid overdose.  Insert white cone into nostril and push plunger to spray one-half (1/2) of syringe contents into each nostril.  Repeat after 3 minutes if no or minimal response until medical assistance arrives., Disp-1 kit, R-1, FaxFor intran ivan administration: Dispense 2 naloxone injection 2 mg/2 mL syringes.  Include 2 mucosal atomization devices (MAD-Nasal).            CONTINUE these medications which have CHANGED    Details   divalproex (DEPAKOTE ER) 500 MG 24 hr tablet Take 2 tablets (1,000 mg) by mouth At Bedtime, Disp-60 tablet, R-0, E-Prescribe         Patient was NOT discharged with buprenorphine-naloxone as he did not either make appointment with waivered provider in community or plan to attend residential treatment.          Consultations:   No consultations were requested during this admission.           Brief History of Illness:   This patient is a 41 year old single, unemployed male, currently living with parents, with history of heroin and methamphetamine IV use, who presented to our ED seeking detoxification and treatment. Has been unable to work, previously did tree work. Denies anxiety/depression yet records indicate h/o bipolar disorder, amended to cognitive disorder, intermittent anxiety/depression. Does not seem to be intentionally deceiving/withholding - patient quite ill with withdrawal. Denies physical complaints outside of withdrawal. Denies intercurrent injury. Was using a gram IV heroin daily, intermittent methamphetamine binges  "IV. Withdrawal complaints at time of exam are hypersomnolence from methamphetamine \"crash\"; irritability, sweats/chills, myalgias, rhinorrhea from opioids. Desires buprenorphine for withdrawal management, as yet undecided about maintenance. States he hopes to seek treatment in [place] where girlfriend lives. He is admitted voluntarily.           Hospital Course:   Opioid withdrawal was monitored and managed with comfort medication and buprenorphine. At admission he expressed interest in residential treatment plus maintenance medication. She then declined case management, and was non-participatory in treatment planning. He understood that he would not receive bridging buprenorphine prescription without follow-up. He stated his plan was to move to be with girlfriend in [facility], and seek outpatient services once there. He demonstrated capacity in decision-making, was not a threat to self or others, so discharge request was granted. Prognosis is guarded regarding relapse.     Discharge ROS:  Complete ROS performed and is negative    Discharge Exam:  Temp: 97.6  F (36.4  C) Temp src: Oral BP: 103/80 Pulse: 93   Resp: 16         General: alert, no distress  Eyes: anicteric, non-injected, pupils normal  ENT: no rhinorrhea, mmm  Lungs: clear, normal WOB  CV: well-perfused, no edema  Neuro: no tremor, normal tone, gait and coordination intact  Skin: no infectious signs, no flushing, no jaundice    MENTAL STATUS EXAM  Appearance: casual, appropriate, fair grooming  Attitude: antsy  Behavior: mild psychomotor agitation  Eye Contact: focused on examiner  Speech: coherent, non-pressured  Orientation: oriented to person , place, time and situation  Mood:  \"I want to go be with my girlfriend\"  Affect: anxious  Thought Process: generally linear, goal-directed, no FOI or SEAN  Suicidal Ideation:denies thought/intent/plan; he denies cravings or immediate plan to use  Hallucination: denies  Insight: partial  Judgment: adequate for " safety         Discharge Instructions and Follow-Up:   Discharge diet: Regular   Discharge activity: Activity as tolerated   Discharge follow-up: See AVS           Discharge Disposition:   Discharged to self, with no treatment plan in place (patient declined case management repeatedly)      Attestation:  I have reviewed today's vital signs, notes, medications, and labs/studies pertinent to this admission    Carla Fuchs MD   Pediatrics/Addiction Medicine

## 2017-03-09 NOTE — PROGRESS NOTES
Patient discharged with personal belongings and discharge medications to home. Discharge medication instructions and lab results reviewed. Patient verbalizes understanding. Denies thoughts of self harm.Patient escorted off the unit by [facility] Associate Stas NICOLE